# Patient Record
Sex: FEMALE | Race: WHITE | NOT HISPANIC OR LATINO | Employment: OTHER | URBAN - METROPOLITAN AREA
[De-identification: names, ages, dates, MRNs, and addresses within clinical notes are randomized per-mention and may not be internally consistent; named-entity substitution may affect disease eponyms.]

---

## 2024-08-23 ENCOUNTER — HOSPITAL ENCOUNTER (EMERGENCY)
Facility: CLINIC | Age: 74
Discharge: HOME | End: 2024-08-23
Attending: EMERGENCY MEDICINE | Admitting: EMERGENCY MEDICINE
Payer: COMMERCIAL

## 2024-08-23 ENCOUNTER — APPOINTMENT (OUTPATIENT)
Dept: GENERAL RADIOLOGY | Facility: CLINIC | Age: 74
End: 2024-08-23
Attending: EMERGENCY MEDICINE
Payer: COMMERCIAL

## 2024-08-23 ENCOUNTER — APPOINTMENT (OUTPATIENT)
Dept: CT IMAGING | Facility: CLINIC | Age: 74
End: 2024-08-23
Attending: EMERGENCY MEDICINE
Payer: COMMERCIAL

## 2024-08-23 VITALS
SYSTOLIC BLOOD PRESSURE: 147 MMHG | BODY MASS INDEX: 43.02 KG/M2 | WEIGHT: 252 LBS | DIASTOLIC BLOOD PRESSURE: 69 MMHG | TEMPERATURE: 97.6 F | HEIGHT: 64 IN | RESPIRATION RATE: 18 BRPM | OXYGEN SATURATION: 100 % | HEART RATE: 74 BPM

## 2024-08-23 DIAGNOSIS — M25.551 RIGHT HIP PAIN: ICD-10-CM

## 2024-08-23 PROCEDURE — 99284 EMERGENCY DEPT VISIT MOD MDM: CPT | Mod: 25

## 2024-08-23 PROCEDURE — 250N000013 HC RX MED GY IP 250 OP 250 PS 637: Performed by: EMERGENCY MEDICINE

## 2024-08-23 PROCEDURE — 73502 X-RAY EXAM HIP UNI 2-3 VIEWS: CPT

## 2024-08-23 PROCEDURE — 72192 CT PELVIS W/O DYE: CPT

## 2024-08-23 RX ORDER — OXYCODONE HYDROCHLORIDE 5 MG/1
2.5-5 TABLET ORAL EVERY 6 HOURS PRN
Qty: 8 TABLET | Refills: 0 | Status: SHIPPED | OUTPATIENT
Start: 2024-08-23

## 2024-08-23 RX ORDER — OXYCODONE HYDROCHLORIDE 5 MG/1
5 TABLET ORAL ONCE
Status: COMPLETED | OUTPATIENT
Start: 2024-08-23 | End: 2024-08-23

## 2024-08-23 RX ADMIN — OXYCODONE HYDROCHLORIDE 5 MG: 5 TABLET ORAL at 09:36

## 2024-08-23 ASSESSMENT — COLUMBIA-SUICIDE SEVERITY RATING SCALE - C-SSRS
1. IN THE PAST MONTH, HAVE YOU WISHED YOU WERE DEAD OR WISHED YOU COULD GO TO SLEEP AND NOT WAKE UP?: NO
6. HAVE YOU EVER DONE ANYTHING, STARTED TO DO ANYTHING, OR PREPARED TO DO ANYTHING TO END YOUR LIFE?: NO
2. HAVE YOU ACTUALLY HAD ANY THOUGHTS OF KILLING YOURSELF IN THE PAST MONTH?: NO

## 2024-08-23 ASSESSMENT — ACTIVITIES OF DAILY LIVING (ADL)
ADLS_ACUITY_SCORE: 35

## 2024-08-23 NOTE — DISCHARGE INSTRUCTIONS
For pain:   Take ibuprofen 600mg every 6 hours as needed for pain  May also take tylenol 1,000mg every 6 hours as needed. It's safe to take these together.     If no relief, ok to take oxycodone as prescribed. Take a stool softener or miralax to prevent constipation while taking this.     I recommend walking with a walker until you're able to follow-up with orthopedics.     Return to emergency department for inability to walk, fever > 100.4, new fall, or for any other concerns.

## 2024-08-23 NOTE — ED TRIAGE NOTES
Pt BIBA from home with spouse- pt had a fall in the garden in July and was able to get herself up. Pt amb to bathroom and sat on toilet, was unable to get up 2/2 r hip  pain. Pain 10/10- A&O x4. Taking ibuprofen last at 0615.      Triage Assessment (Adult)       Row Name 08/23/24 0883          Triage Assessment    Airway WDL WDL        Respiratory WDL    Respiratory WDL WDL        Skin Circulation/Temperature WDL    Skin Circulation/Temperature WDL WDL        Cardiac WDL    Cardiac WDL WDL        Peripheral/Neurovascular WDL    Peripheral Neurovascular WDL X  r l/e swelling        Cognitive/Neuro/Behavioral WDL    Cognitive/Neuro/Behavioral WDL WDL

## 2024-08-23 NOTE — ED PROVIDER NOTES
"  Emergency Department Note      History of Present Illness     Chief Complaint   Hip Pain      HPI   Laina Black is a 74 year old female with a history of hyperlipidemia and hypertension on aspirin who presents to the ED with hip pain. The patient reports she fell a month ago and began experiencing right hip pain. She reports she tried going to the gym twice within the past month and both times her right hip pain became more severe and she had to rest. She reports she had severe sharp right hip pain this morning that prevented her from standing up from the toilet without screaming. She reports she is taking ibuprofen and has not been seen for this condition. She denies numbness/tingling or knee pain.     Independent Historian   None    Review of External Notes   none    Past Medical History     Medical History and Problem List   Acquired hypothyroidism  Closed fracture of ankle  Hyperlipidemia  Hypertension  Hypothyroidism  Impaired glucose tolerance test  Lump or mass of breast  Obesity  Positive FIT    Medications   Aspirin - 81 mg  Atorvastatin  Losartan  Levothyroxine    Surgical History   Ankle surgery  Biopsy of breast, open incisional (R)  Dilation and curettage  Puncture aspiration cyst breast (R)  Tonsil and adenoidectomy  Tubal ligation  Tympanostomy tube placement    Physical Exam     Patient Vitals for the past 24 hrs:   BP Temp Temp src Pulse Resp SpO2 Height Weight   08/23/24 1201 (!) 166/85 -- -- 79 -- 96 % -- --   08/23/24 0905 -- 97.6  F (36.4  C) Temporal -- -- 97 % -- --   08/23/24 0856 (!) 158/72 -- -- 76 18 97 % 1.626 m (5' 4\") 114.3 kg (252 lb)     Physical Exam  Nursing note and vitals reviewed.  HENT:   Mouth/Throat: Moist mucous membranes.   Eyes: EOMI, nonicteric sclera  Cardiovascular: Normal rate, regular rhythm, no murmurs, rubs, or gallops  Pulmonary/Chest: Effort normal and breath sounds normal. No respiratory distress. No wheezes. No rales.   Abdominal: Soft. Nontender, " nondistended, no guarding or rigidity.   Musculoskeletal: Pain to palpation over right hip. No pain with log roll. Pain with flexion to about 45 degrees.   Neurological: Alert. Moves all extremities spontaneously. Normal sensation all 5 distal nerve distributions BLE. Normal DF/PF, EHL/FHL bilaterally.   Skin: Skin is warm and dry. No rash noted.         Diagnostics     Lab Results   Labs Ordered and Resulted from Time of ED Arrival to Time of ED Departure - No data to display    Imaging   CT Pelvis Bone wo Contrast   Final Result   IMPRESSION:   1.  No acute fracture, however, degree of demineralization may limit   evaluation for nondisplaced fractures. If clinical concern remains   high for an occult fracture, MRI would be more sensitive for   evaluation.   2.  Moderate degenerative arthritis both hips.         GRETCHEN REY DO            SYSTEM ID:  [id number]      XR Pelvis w Hip Right 1 View   Final Result   IMPRESSION: Mild degenerative arthrosis both hips. No acute fracture.   Degenerative arthrosis of both SI joints. Lower lumbar facet   arthropathy.      GRETCHEN REY DO            SYSTEM ID:  [id number]          Independent Interpretation   I independently reviewed the hip/pelvis x-ray. I see no evidence of fracture/dislocation.       ED Course      Medications Administered   Medications   oxyCODONE (ROXICODONE) tablet 5 mg (5 mg Oral $Given 8/23/24 0936)       Discussion of Management   None    ED Course   ED Course as of 08/23/24 1225   Fri Aug 23, 2024   0928 I obtained history and examined the patient as noted above.   1210 I rechecked the patient and explained findings.       Additional Documentation  None    Medical Decision Making / Diagnosis     CMS Diagnoses: None    MIPS       None    MDM   Laina Black is a 74 year old female who presents with ongoing right hip pain about 1 month after suffering a fall.  She has had several exacerbations of pain in this time.  X-ray negative for  fracture/dislocation.  I elected to obtain a CT which was fortunately also negative.  While MRI is more sensitive for acute fracture, given fall was 1 month ago, I believe fracture would be visible on CT today.  After treatment with oxycodone, patient is more comfortable and able to ambulate here with the assist of a walker.  She reports she has a walker at home, and does not need one from us.  I recommended that she follow-up closely with orthopedics as she may need an MRI if her pain is not improving.  We also discussed presence of arthritis in the joint as well as bursitis is possible causes of pain. She is in stable condition at the time of discharge, red flags that should merit ED return were discussed as well as recommended follow-up instructions. All questions were answered and she is in agreement with the plan.      Disposition   The patient was discharged.     Diagnosis     ICD-10-CM    1. Right hip pain  M25.551            Discharge Medications   New Prescriptions    OXYCODONE (ROXICODONE) 5 MG TABLET    Take 0.5-1 tablets (2.5-5 mg) by mouth every 6 hours as needed for moderate to severe pain.     Side effects discussed.     Scribe Disclosure:  I, Oscar Calderon, am serving as a scribe at 9:10 AM on 8/23/2024 to document services personally performed by Tor Appiah MD based on my observations and the provider's statements to me.        Tor Appiah MD  08/24/24 6617